# Patient Record
(demographics unavailable — no encounter records)

---

## 2024-11-01 NOTE — DISCUSSION/SUMMARY
[FreeTextEntry1] : REASON FOR CONSULT: Aleena Antonio (legal name Prachi) is a 24-year-old transmale (assigned female at birth, uses they/them pronouns) referred by nurse practitioner, Margot Trinidad, for cancer genetic counseling and risk assessment due to a family history of cancer prior to undergoing to surgery for gender affirming care. Aleena was seen on 2024 at which time medical and family history was ascertained and a pedigree constructed.   RELEVANT MEDICAL HISTORY: Aleena is a healthy individual with no reported history of cancer. They have a family history of cancer, see below. Patient is planning top surgery for gender affirming care (Dr. Gasper Parker) and surgical team recommended genetic testing prior as it may help determine surgical approach. Patient is hoping to proceed with surgery in 2025.   [facility] MEDICAL AND SURGICAL HISTORY: -	Medical History: no significant history reported -	Surgical History: arm fracture repair  OB/GYN HISTORY: H/W: 5'5", 140lbs Obstetrical History:  Age at Menarche: 12 Menopausal Status: Premenopausal  Age at First Live Birth: N/A Oral Contraceptive Use: Yes, 6 months previously Hormone Therapy: Testosterone, started in 2024  CANCER SCREENING HISTORY:   Breast:  -	Mammography: No -	Sonography: 24, left- palpable left breast lump, no sonographic evidence of malignancy, BIRADS1 -	MRI: No -	Biopsies: No GYN: -	Pelvic Examination: yes at age 21, reportedly wnl Colon: -	Colonoscopy: No Skin:   -	FBSE: Yes -	Lesions biopsied/removed: No  SOCIAL HISTORY: -	Tobacco-product use: Yes, former in college -	Environmental exposures: no   FAMILY HISTORY: Maternal ancestry was reported as Western  and paternal ancestry was reported as Malian/Fany. Ashkenazi Yarsanism ancestry was denied. A detailed family history of cancer was ascertained, see below and scanned chart for pedigree.   According to Aleena no one in the family has had germline testing for cancer susceptibility. Consanguinity was denied.  	 RISK ASSESSMENT: Aleena's personal and family history is suggestive of a hereditary cancer syndrome given their paternal uncle's history of pancreatic cancer, paternal grandmother with breast cancer at age 49, maternal grandfather with metastatic prostate cancer, and paternal grandfather's history of colon cancer. The patient meets National Comprehensive Cancer Network (NCCN) criteria for genetic testing. We also discussed how genetic testing may potentially impact surgical planning for top surgery. We recommended genetic testing for genes associated with breast, gynecological, and pancreatic cancers through [facility]. This test analyzes 25 genes: APC, SOPHIA, BARD1, BRCA1, BRCA2, BRIP1, CDH1, CDKN2A, CHEK2, EPCAM, MEN1, MLH1, MSH2, MSH6, NF1, PALB2, PMS2, PTEN, RAD51C, RAD51D, STK11, TP53, TSC1, TSC2, and VHL.  The risks, benefits and limitations of genetic testing were discussed with Aleena. In addition, we discussed the purpose of genetic testing and possible test results (positive, negative, inconclusive) along with associated medical management options and psychosocial implications. Insurance coverage and potential out of pocket costs were also discussed. The Genetic Information Non-discrimination Act (ANNIE) was reviewed.  It was explained that risk assessment is based upon medical and family history as provided and may change in the future should new information be obtained.   Following our discussion, Aleena consented to the above-mentioned genetic testing panel. Blood was drawn in our laboratory and sent to [facility] today.  PLAN:  1.	Blood drawn today will be sent to [facility] for analysis.  2.	We will contact Aleena to schedule a follow-up appointment once the results are available. Results generally return in 2-3 weeks.   For any additional questions please call Cancer Genetics at [phone].    Monica Zamora MS, [facility] Genetic Counselor, Cancer Genetics

## 2024-11-11 NOTE — DISCUSSION/SUMMARY
[FreeTextEntry1] : RESULTS TRANSMISSION:   Aleena Antonio (legal name Prachi) is a 24-year-old transmale (assigned female at birth, uses they/them pronouns) who was contacted on November 7, 2024 for a discussion regarding their negative genetic testing results related to hereditary cancer predisposition. This session was conducted via telephone.   Aleena was originally seen by the Cancer Genetics Service on October 24, 2024 for hereditary cancer predisposition risk assessment due to a family history of cancer prior to undergoing top surgery for gender affirming care. At that time, Aleena decided to pursue genetic testing for genes associated with breast, gynecological, and pancreatic cancers through [facility].  TEST RESULTS: NEGATIVE NO pathogenic (disease-causing) variants or variants of uncertain significance were detected in any of the following genes [25]:  APC, SOPHIA, BARD1, BRCA1, BRCA2, BRIP1, CDH1, CDKN2A, CHEK2, EPCAM, MEN1, MLH1, MSH2, MSH6, NF1, PALB2, PMS2, PTEN, RAD51C, RAD51D, STK11, TP53, TSC1, TSC2, and VHL.  RESULTS INTERPRETATION AND ASSESSMENT: Given  Aleena's personal and current reported family history of cancer, and their negative genetic test results, the following screening guidelines and risk-reducing recommendations were discussed:  BREAST:  -	It was discussed Aleena's surgical plan should not be impacted by these negative genetic testing results. -	We did recommend Aleena consult with their breast care team in the future to determine if any screening is indicated dependent upon remaining amount of breast tissue given their family history of breast cancer.   OTHER: -	In the absence of other indications, Aleena should practice age-appropriate cancer screening of other organ systems as recommended for the general population.  We also discussed that, while no cause of the patient's personal and family history of cancer was identified, this result, while reassuring, does entirely not rule out a hereditary cancer risk in the patient. It is possible, although unlikely, the patient has a mutation in one of the genes tested that is not detectable by this analysis, or has a mutation in a different gene, either known or unknown. It is also possible there is a hereditary cancer predisposition in the family, but the patient did not inherit it.   We informed Aleena that our knowledge of genetics and inherited cancer conditions is changing rapidly. Therefore, we recommended that Aleena contact our office, every 2 to 3 years, to discuss relevant advances in cancer genetics.  We emphasized the importance of re-contacting us with updates regarding their personal and family history of cancer as well as any updates regarding additional cancer genetic test results performed for the patient and/or family members.  Such updates could possibly change our risk assessment and recommendations.   PLAN: 1.	These results do not change Aleena's medical management. 2.	Patient informed consult note(s) will be available through their [facility] patient portal and genetic test results will be released via [facility]'s portal. 3.	Aleena was encouraged to contact us every 2-3 years to discuss relevant advances in cancer genetics, or sooner if there are any changes in their personal or family history of cancer.   For any additional questions please call Cancer Genetics at [phone].    Monica Zamora MS, [facility] Genetic Counselor, Cancer Genetics